# Patient Record
Sex: MALE | URBAN - METROPOLITAN AREA
[De-identification: names, ages, dates, MRNs, and addresses within clinical notes are randomized per-mention and may not be internally consistent; named-entity substitution may affect disease eponyms.]

---

## 2020-10-01 ENCOUNTER — APPOINTMENT (RX ONLY)
Dept: URBAN - METROPOLITAN AREA CLINIC 51 | Facility: CLINIC | Age: 35
Setting detail: DERMATOLOGY
End: 2020-10-01

## 2020-10-01 DIAGNOSIS — D17 BENIGN LIPOMATOUS NEOPLASM: ICD-10-CM

## 2020-10-01 DIAGNOSIS — D22 MELANOCYTIC NEVI: ICD-10-CM

## 2020-10-01 PROBLEM — D22.9 MELANOCYTIC NEVI, UNSPECIFIED: Status: ACTIVE | Noted: 2020-10-01

## 2020-10-01 PROBLEM — D17.30 BENIGN LIPOMATOUS NEOPLASM OF SKIN AND SUBCUTANEOUS TISSUE OF UNSPECIFIED SITES: Status: ACTIVE | Noted: 2020-10-01

## 2020-10-01 PROCEDURE — ? DEFER

## 2020-10-01 PROCEDURE — ? NOTED ON EXAM BUT NOT TREATED

## 2020-10-01 PROCEDURE — ? COUNSELING

## 2020-10-01 PROCEDURE — 99203 OFFICE O/P NEW LOW 30 MIN: CPT

## 2020-10-01 PROCEDURE — ? CONSULTATION EXCISION

## 2020-10-01 NOTE — PROCEDURE: CONSULTATION EXCISION
Date Scheduled For Excision (Optional): next available
Detail Level: Detailed
Anatomic Location From Referring Provider: right mid back
X Size Of Lesion In Cm (Optional): 0
Anatomic Location From Referring Provider: right upper back
Anatomic Location From Referring Provider: left neck

## 2020-10-01 NOTE — PROCEDURE: DEFER
Detail Level: Zone
Introduction Text (Please End With A Colon): Brenton Mae
Procedure To Be Performed At Next Visit: Excision
Instructions (Optional): Request excisions to the right mid back, right upper back, and the left neck.